# Patient Record
Sex: MALE | Race: NATIVE HAWAIIAN OR OTHER PACIFIC ISLANDER | NOT HISPANIC OR LATINO | Employment: FULL TIME | URBAN - METROPOLITAN AREA
[De-identification: names, ages, dates, MRNs, and addresses within clinical notes are randomized per-mention and may not be internally consistent; named-entity substitution may affect disease eponyms.]

---

## 2019-02-11 ENCOUNTER — HOSPITAL ENCOUNTER (EMERGENCY)
Facility: MEDICAL CENTER | Age: 19
End: 2019-02-11
Attending: EMERGENCY MEDICINE
Payer: COMMERCIAL

## 2019-02-11 ENCOUNTER — APPOINTMENT (OUTPATIENT)
Dept: RADIOLOGY | Facility: MEDICAL CENTER | Age: 19
End: 2019-02-11
Attending: EMERGENCY MEDICINE
Payer: COMMERCIAL

## 2019-02-11 VITALS
SYSTOLIC BLOOD PRESSURE: 107 MMHG | HEART RATE: 78 BPM | DIASTOLIC BLOOD PRESSURE: 60 MMHG | WEIGHT: 145.5 LBS | TEMPERATURE: 97.2 F | OXYGEN SATURATION: 98 % | BODY MASS INDEX: 24.84 KG/M2 | RESPIRATION RATE: 16 BRPM | HEIGHT: 64 IN

## 2019-02-11 DIAGNOSIS — N45.1 EPIDIDYMITIS: ICD-10-CM

## 2019-02-11 LAB
APPEARANCE UR: CLEAR
APPEARANCE UR: CLEAR
BILIRUB UR QL STRIP.AUTO: NEGATIVE
COLOR UR AUTO: ABNORMAL
COLOR UR: YELLOW
GLUCOSE UR QL STRIP.AUTO: NEGATIVE MG/DL
GLUCOSE UR STRIP.AUTO-MCNC: NEGATIVE MG/DL
KETONES UR QL STRIP.AUTO: NEGATIVE MG/DL
KETONES UR STRIP.AUTO-MCNC: ABNORMAL MG/DL
LEUKOCYTE ESTERASE UR QL STRIP.AUTO: NEGATIVE
LEUKOCYTE ESTERASE UR QL STRIP.AUTO: NEGATIVE
MICRO URNS: ABNORMAL
NITRITE UR QL STRIP.AUTO: NEGATIVE
NITRITE UR QL STRIP.AUTO: NEGATIVE
PH UR STRIP.AUTO: 5.5 [PH]
PH UR STRIP.AUTO: 6 [PH]
PROT UR QL STRIP: ABNORMAL MG/DL
PROT UR QL STRIP: NEGATIVE MG/DL
RBC UR QL AUTO: NEGATIVE
RBC UR QL AUTO: NEGATIVE
SP GR UR STRIP.AUTO: 1.03
SP GR UR: 1.02
UROBILINOGEN UR STRIP.AUTO-MCNC: 0.2 MG/DL

## 2019-02-11 PROCEDURE — 76870 US EXAM SCROTUM: CPT

## 2019-02-11 PROCEDURE — 81003 URINALYSIS AUTO W/O SCOPE: CPT

## 2019-02-11 PROCEDURE — 99284 EMERGENCY DEPT VISIT MOD MDM: CPT

## 2019-02-11 PROCEDURE — 87491 CHLMYD TRACH DNA AMP PROBE: CPT

## 2019-02-11 PROCEDURE — 81002 URINALYSIS NONAUTO W/O SCOPE: CPT

## 2019-02-11 PROCEDURE — 87591 N.GONORRHOEAE DNA AMP PROB: CPT

## 2019-02-11 RX ORDER — IBUPROFEN 800 MG/1
800 TABLET ORAL
Qty: 30 TAB | Refills: 0 | Status: SHIPPED | OUTPATIENT
Start: 2019-02-11 | End: 2021-02-26

## 2019-02-11 RX ORDER — DOXYCYCLINE 100 MG/1
100 CAPSULE ORAL 2 TIMES DAILY
Qty: 14 CAP | Refills: 0 | Status: SHIPPED | OUTPATIENT
Start: 2019-02-11

## 2019-02-11 RX ORDER — HYDROCODONE BITARTRATE AND ACETAMINOPHEN 5; 325 MG/1; MG/1
1 TABLET ORAL EVERY 8 HOURS PRN
Qty: 20 TAB | Refills: 0 | Status: SHIPPED | OUTPATIENT
Start: 2019-02-11 | End: 2019-02-17

## 2019-02-11 NOTE — ED NOTES
Pt given discharge instructions/prescriptions given/ narcotic consent signed/ work note given/ home care instructions explained/ pt understands the importance of follow up, pt verbalized understanding of instructions given, pt ambulatory to TRACY wagner.

## 2019-02-12 LAB
C TRACH DNA SPEC QL NAA+PROBE: NEGATIVE
N GONORRHOEA DNA SPEC QL NAA+PROBE: NEGATIVE
SPECIMEN SOURCE: NORMAL

## 2019-02-12 NOTE — ED PROVIDER NOTES
"ED Provider Note    CHIEF COMPLAINT  Chief Complaint   Patient presents with   • Testicle Pain     right side swelling and pain x2 weeks       HPI  Kyle Wheat is a 18 y.o. male who presents to emerge from today with complaints of 2-week history of right testicular pain.  Patient denies any injury to the area he does have some swelling and pain over the right testicle no nausea vomiting no difficulty urinating no burning or frequency urination no sores or discharge.    REVIEW OF SYSTEMS  See HPI for further details. All other systems are negative.      PAST MEDICAL HISTORY  No past medical history on file.    FAMILY HISTORY  History reviewed. No pertinent family history.    SOCIAL HISTORY  Social History     Social History   • Marital status:      Spouse name: N/A   • Number of children: N/A   • Years of education: N/A     Social History Main Topics   • Smoking status: Never Smoker   • Smokeless tobacco: Current User     Types: Chew   • Alcohol use No   • Drug use: No   • Sexual activity: Not on file     Other Topics Concern   • Not on file     Social History Narrative   • No narrative on file       SURGICAL HISTORY  Past Surgical History:   Procedure Laterality Date   • OTHER  7 year old    \"spine surgery\" unsure       CURRENT MEDICATIONS  Home Medications     Reviewed by Prem Gupta R.N. (Registered Nurse) on 02/11/19 at 1105  Med List Status: Complete   Medication Last Dose Status        Patient Osmar Taking any Medications                       ALLERGIES  No Known Allergies    PHYSICAL EXAM  VITAL SIGNS: /60   Pulse 78   Temp 36.2 °C (97.2 °F) (Temporal)   Resp 16   Ht 1.626 m (5' 4\")   Wt 66 kg (145 lb 8.1 oz)   SpO2 98%   BMI 24.98 kg/m²  Room air O2: 99    Constitutional: Well developed, Well nourished, No acute distress, Non-toxic appearance.   HENT: Normocephalic, Atraumatic, Bilateral external ears normal, Oropharynx moist, No oral exudates, Nose normal.   Eyes: PERRLA, EOMI, " Conjunctiva normal, No discharge.   Neck: Normal range of motion, No tenderness, Supple, No stridor.   Lymphatic: No lymphadenopathy noted.   Cardiovascular: Normal heart rate, Normal rhythm, No murmurs, No rubs, No gallops.   Thorax & Lungs: Normal breath sounds, No respiratory distress, No wheezing, No chest tenderness.   Abdomen: Bowel sounds normal, Soft, No tenderness, No masses, No pulsatile masses.   Genitalia: Tenderness and swelling over the epididymis and right testicle area no hernia noted no discharge.  Skin: Warm, Dry, No erythema, No rash.   Back: No tenderness, No CVA tenderness.     RADIOLOGY/PROCEDURES  [id number]   Final Result      Findings moderately suspicious for RIGHT epididymitis            COURSE & MEDICAL DECISION MAKING  Pertinent Labs & Imaging studies reviewed. (See chart for details)  Patient has epididymitis we discussed use of a jock supporter may use ice to the area placed on nonsteroidal anti-inflammatory and doxycycline GC culture pending patient verbalizes understand instructions given referral to urologist Dr. Loco.    FINAL IMPRESSION  1.  Acute epididymitis, right testicle  2.   3.      Electronically signed by: Thai Gay, 2/11/2019 6:01 PM

## 2019-04-30 ENCOUNTER — HOSPITAL ENCOUNTER (OUTPATIENT)
Dept: LAB | Facility: MEDICAL CENTER | Age: 19
End: 2019-04-30
Attending: PHYSICIAN ASSISTANT
Payer: COMMERCIAL

## 2019-04-30 LAB
ALBUMIN SERPL BCP-MCNC: 4.3 G/DL (ref 3.2–4.9)
ALBUMIN/GLOB SERPL: 1.3 G/DL
ALP SERPL-CCNC: 76 U/L (ref 30–99)
ALT SERPL-CCNC: 29 U/L (ref 2–50)
ANION GAP SERPL CALC-SCNC: 7 MMOL/L (ref 0–11.9)
AST SERPL-CCNC: 21 U/L (ref 12–45)
BASOPHILS # BLD AUTO: 0.3 % (ref 0–1.8)
BASOPHILS # BLD: 0.02 K/UL (ref 0–0.12)
BILIRUB SERPL-MCNC: 0.6 MG/DL (ref 0.1–1.5)
BUN SERPL-MCNC: 18 MG/DL (ref 8–22)
CALCIUM SERPL-MCNC: 9.4 MG/DL (ref 8.5–10.5)
CHLORIDE SERPL-SCNC: 101 MMOL/L (ref 96–112)
CHOLEST SERPL-MCNC: 107 MG/DL (ref 100–199)
CO2 SERPL-SCNC: 30 MMOL/L (ref 20–33)
CREAT SERPL-MCNC: 0.81 MG/DL (ref 0.5–1.4)
EOSINOPHIL # BLD AUTO: 0.45 K/UL (ref 0–0.51)
EOSINOPHIL NFR BLD: 6.1 % (ref 0–6.9)
ERYTHROCYTE [DISTWIDTH] IN BLOOD BY AUTOMATED COUNT: 36.1 FL (ref 35.9–50)
FASTING STATUS PATIENT QL REPORTED: NORMAL
GLOBULIN SER CALC-MCNC: 3.2 G/DL (ref 1.9–3.5)
GLUCOSE SERPL-MCNC: 88 MG/DL (ref 65–99)
HCT VFR BLD AUTO: 49.2 % (ref 42–52)
HDLC SERPL-MCNC: 28 MG/DL
HGB BLD-MCNC: 16.5 G/DL (ref 14–18)
IMM GRANULOCYTES # BLD AUTO: 0.01 K/UL (ref 0–0.11)
IMM GRANULOCYTES NFR BLD AUTO: 0.1 % (ref 0–0.9)
LDLC SERPL CALC-MCNC: 67 MG/DL
LYMPHOCYTES # BLD AUTO: 2.13 K/UL (ref 1–4.8)
LYMPHOCYTES NFR BLD: 28.9 % (ref 22–41)
MCH RBC QN AUTO: 28.5 PG (ref 27–33)
MCHC RBC AUTO-ENTMCNC: 33.5 G/DL (ref 33.7–35.3)
MCV RBC AUTO: 85.1 FL (ref 81.4–97.8)
MONOCYTES # BLD AUTO: 0.47 K/UL (ref 0–0.85)
MONOCYTES NFR BLD AUTO: 6.4 % (ref 0–13.4)
NEUTROPHILS # BLD AUTO: 4.29 K/UL (ref 1.82–7.42)
NEUTROPHILS NFR BLD: 58.2 % (ref 44–72)
NRBC # BLD AUTO: 0 K/UL
NRBC BLD-RTO: 0 /100 WBC
PLATELET # BLD AUTO: 212 K/UL (ref 164–446)
PMV BLD AUTO: 10.5 FL (ref 9–12.9)
POTASSIUM SERPL-SCNC: 3.6 MMOL/L (ref 3.6–5.5)
PROT SERPL-MCNC: 7.5 G/DL (ref 6–8.2)
RBC # BLD AUTO: 5.78 M/UL (ref 4.7–6.1)
SODIUM SERPL-SCNC: 138 MMOL/L (ref 135–145)
T3FREE SERPL-MCNC: 4.29 PG/ML (ref 2.4–4.2)
T4 FREE SERPL-MCNC: 0.91 NG/DL (ref 0.53–1.43)
TRIGL SERPL-MCNC: 58 MG/DL (ref 0–149)
TSH SERPL DL<=0.005 MIU/L-ACNC: 0.06 UIU/ML (ref 0.38–5.33)
WBC # BLD AUTO: 7.4 K/UL (ref 4.8–10.8)

## 2019-04-30 PROCEDURE — 84481 FREE ASSAY (FT-3): CPT

## 2019-04-30 PROCEDURE — 80053 COMPREHEN METABOLIC PANEL: CPT

## 2019-04-30 PROCEDURE — 80061 LIPID PANEL: CPT

## 2019-04-30 PROCEDURE — 85025 COMPLETE CBC W/AUTO DIFF WBC: CPT

## 2019-04-30 PROCEDURE — 84439 ASSAY OF FREE THYROXINE: CPT

## 2019-04-30 PROCEDURE — 84443 ASSAY THYROID STIM HORMONE: CPT

## 2019-04-30 PROCEDURE — 36415 COLL VENOUS BLD VENIPUNCTURE: CPT

## 2020-03-19 ENCOUNTER — TELEPHONE (OUTPATIENT)
Dept: SCHEDULING | Facility: IMAGING CENTER | Age: 20
End: 2020-03-19

## 2020-03-19 ENCOUNTER — TELEPHONE (OUTPATIENT)
Dept: HEALTH INFORMATION MANAGEMENT | Facility: OTHER | Age: 20
End: 2020-03-19

## 2020-03-19 ENCOUNTER — APPOINTMENT (OUTPATIENT)
Dept: MEDICAL GROUP | Facility: LAB | Age: 20
End: 2020-03-19
Payer: COMMERCIAL

## 2020-03-19 NOTE — TELEPHONE ENCOUNTER
1. Caller Name: Kyle Wheat           Call Back Number:[phone]    [facility] PCP or Specialty Provider: No            2.  Does patient have any active symptoms of respiratory illness (fever OR cough OR shortness of breath)? No.    3.  Does patient have any comoribidities? None     4.  In the last 30 days, has the patient traveled outside of the country OR in a high risk area within the [place] OR have any known contact with someone who has or is suspected to have COVID-19?  No.    5. Disposition: Cleared by RN Triage; OK to keep/schedule appointment    Note routed to PCP: FYI only.

## 2020-05-31 ENCOUNTER — APPOINTMENT (OUTPATIENT)
Dept: RADIOLOGY | Facility: MEDICAL CENTER | Age: 20
End: 2020-05-31
Attending: EMERGENCY MEDICINE
Payer: COMMERCIAL

## 2020-05-31 ENCOUNTER — HOSPITAL ENCOUNTER (EMERGENCY)
Facility: MEDICAL CENTER | Age: 20
End: 2020-05-31
Attending: EMERGENCY MEDICINE | Admitting: EMERGENCY MEDICINE
Payer: COMMERCIAL

## 2020-05-31 VITALS
TEMPERATURE: 98.6 F | HEART RATE: 81 BPM | RESPIRATION RATE: 18 BRPM | HEIGHT: 64 IN | SYSTOLIC BLOOD PRESSURE: 118 MMHG | OXYGEN SATURATION: 98 % | WEIGHT: 142 LBS | BODY MASS INDEX: 24.24 KG/M2 | DIASTOLIC BLOOD PRESSURE: 82 MMHG

## 2020-05-31 DIAGNOSIS — J06.9 UPPER RESPIRATORY TRACT INFECTION, UNSPECIFIED TYPE: ICD-10-CM

## 2020-05-31 DIAGNOSIS — H66.002 NON-RECURRENT ACUTE SUPPURATIVE OTITIS MEDIA OF LEFT EAR WITHOUT SPONTANEOUS RUPTURE OF TYMPANIC MEMBRANE: ICD-10-CM

## 2020-05-31 DIAGNOSIS — Z20.822 SUSPECTED COVID-19 VIRUS INFECTION: ICD-10-CM

## 2020-05-31 DIAGNOSIS — R51.9 ACUTE NONINTRACTABLE HEADACHE, UNSPECIFIED HEADACHE TYPE: ICD-10-CM

## 2020-05-31 DIAGNOSIS — J02.9 SORE THROAT: ICD-10-CM

## 2020-05-31 LAB
COVID ORDER STATUS COVID19: NORMAL
S PYO DNA SPEC NAA+PROBE: NOT DETECTED

## 2020-05-31 PROCEDURE — A9270 NON-COVERED ITEM OR SERVICE: HCPCS | Performed by: EMERGENCY MEDICINE

## 2020-05-31 PROCEDURE — 87651 STREP A DNA AMP PROBE: CPT

## 2020-05-31 PROCEDURE — 71045 X-RAY EXAM CHEST 1 VIEW: CPT

## 2020-05-31 PROCEDURE — 700102 HCHG RX REV CODE 250 W/ 637 OVERRIDE(OP): Performed by: EMERGENCY MEDICINE

## 2020-05-31 PROCEDURE — 99283 EMERGENCY DEPT VISIT LOW MDM: CPT

## 2020-05-31 PROCEDURE — C9803 HOPD COVID-19 SPEC COLLECT: HCPCS | Performed by: EMERGENCY MEDICINE

## 2020-05-31 RX ORDER — ACETAMINOPHEN 325 MG/1
975 TABLET ORAL ONCE
Status: COMPLETED | OUTPATIENT
Start: 2020-05-31 | End: 2020-05-31

## 2020-05-31 RX ORDER — AMOXICILLIN 500 MG/1
1000 CAPSULE ORAL 2 TIMES DAILY
Qty: 28 CAP | Refills: 0 | Status: SHIPPED | OUTPATIENT
Start: 2020-05-31 | End: 2020-06-07

## 2020-05-31 RX ORDER — ACETAMINOPHEN 500 MG
1000 TABLET ORAL ONCE
Status: DISCONTINUED | OUTPATIENT
Start: 2020-05-31 | End: 2020-05-31

## 2020-05-31 RX ORDER — AMOXICILLIN 500 MG/1
1000 CAPSULE ORAL ONCE
Status: COMPLETED | OUTPATIENT
Start: 2020-05-31 | End: 2020-05-31

## 2020-05-31 RX ORDER — ACETAMINOPHEN 500 MG
1000 TABLET ORAL EVERY 8 HOURS PRN
Qty: 20 TAB | Refills: 0 | Status: SHIPPED | OUTPATIENT
Start: 2020-05-31 | End: 2021-02-26

## 2020-05-31 RX ADMIN — ACETAMINOPHEN 975 MG: 325 TABLET, FILM COATED ORAL at 18:57

## 2020-05-31 RX ADMIN — AMOXICILLIN 1000 MG: 500 CAPSULE ORAL at 18:57

## 2020-05-31 ASSESSMENT — FIBROSIS 4 INDEX: FIB4 SCORE: 0.39

## 2020-05-31 ASSESSMENT — LIFESTYLE VARIABLES: DO YOU DRINK ALCOHOL: NO

## 2020-05-31 NOTE — Clinical Note
Kyle Wheat was seen and treated in our emergency department on 5/31/2020.  He may return to work on 06/02/2020.       If you have any questions or concerns, please don't hesitate to call.      Tc Morgan M.D.

## 2020-06-01 NOTE — ED PROVIDER NOTES
ED Provider Note    CHIEF COMPLAINT  Chief Complaint   Patient presents with   • Headache       Hospitals in [place]    Primary care provider: None  Means of arrival: POV  History obtained from: Patient  History limited by: Nothing    Kyle Wheat is a 19 y.o. male who presents with URI symptoms and headache.  Symptoms began approximately 2 to 3 days ago.  He mostly has a scratchy sore throat, as well as a dull achy right sided nonradiating intermittent headache.  No thunderclap onset.  No falls or injuries or trauma.  No double or blurry vision.  No numbness or weakness or tingling speech changes.  No confusion.  No fevers.  He tried an over-the-counter pain relief medication which gave only minimal relief.  No aggravating factors noted.  No history of headaches or trauma or migraine.  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  No known coronavirus contacts.  He has no dyspnea, but does report an intermittent mild dry cough.  Takes no daily medications otherwise healthy.    REVIEW OF SYSTEMS  Constitutional: Negative for fever or chills.   HENT: Negative for nosebleeds but positive for right-sided headache and sore throat.    Eyes: Negative for vision changes or discharge.   Respiratory: Positive for intermittent cough, negative for dyspnea.  Cardiovascular: Negative for chest pain or palpitations.   Gastrointestinal: Negative for nausea, vomiting, or abdominal pain.   Genitourinary: Negative for dysuria or flank pain.   Musculoskeletal: Negative for back pain or joint pain.   Skin: Negative for itching or rash.   Neurological: Negative for sensory or motor changes.   See HPI for further details. All other systems are negative.     PAST MEDICAL HISTORY  No chronic medical history no daily medications.    PAST FAMILY HISTORY  History reviewed no pertinent past family history.    SOCIAL HISTORY  Social History     Tobacco Use   • Smoking status: Never Smoker   • Smokeless  "tobacco: Current User     Types: Chew   Substance and Sexual Activity   • Alcohol use: No   • Drug use: No   • Sexual activity: Not on file       SURGICAL HISTORY   has a past surgical history that includes other (7 year old).    CURRENT MEDICATIONS  No daily medications as needed over-the-counter spray complaints.    ALLERGIES  No Known Allergies    PHYSICAL EXAM  VITAL SIGNS: /82   Pulse 81   Temp 37 °C (98.6 °F)   Resp 18   Ht 1.626 m (5' 4\")   Wt 64.4 kg (142 lb)   SpO2 98%   BMI 24.37 kg/m²    Pulse ox interpretation: On room air, I interpret this pulse ox as normal.  Constitutional: Well-developed, well-nourished. Sitting up.   HEENT: Normocephalic, atraumatic. Posterior pharynx slightly erythematous uvula midline no exudates, left TM is dull with mild bulge, mucous membranes moist.  Eyes:  EOMI. Normal sclerae.  Neck: Supple, nontender.  Chest/Pulmonary: Clear to ausculation bilaterally, no wheezes or rhonchi.  Cardiovascular: Regular rate and rhythm, no murmur.   Abdomen: Soft, nontender; no rebound, guarding, or masses.  Back: No CVA or midline tenderness.   Musculoskeletal: No deformity or edema.  Neuro: Clear speech, normal coordination, cranial nerves II-XII grossly intact, no focal asymmetry or sensory deficits.   Psych: Normal mood and affect.  Skin: No rashes, warm and dry.      DIAGNOSTIC STUDIES / PROCEDURES    LABS & EKG  Results for orders placed or performed during the hospital encounter of 05/31/20   Group A Strep by PCR    Specimen: Throat   Result Value Ref Range    Group A Strep by PCR Not detected Not Detected   COVID/SARS CoV-2    Specimen: Nasopharyngeal; Respirate   Result Value Ref Range    COVID Order Status Received    2019-nCoV RNA ([facility])   Result Value Ref Range    2019-nCoV Source NP Swab          RADIOLOGY  DX-CHEST-PORTABLE (1 VIEW)   Final Result      No acute cardiopulmonary abnormality identified.          COURSE & MEDICAL DECISION MAKING    This is a 19 y.o. male " who presents with URI symptoms and headache.  Normal neurologic examination no trauma.    Differential Diagnosis includes but is not limited to:  Tension, URI, migraine, intracranial hemorrhage, mass, coronavirus, strep throat    ED Course:  This is a pleasant healthy 19-year-old male coming in with URI symptoms and headache, given current diabetic medicine treatment for coronavirus testing.  He has a sore throat as well we will assess with strep testing, screening chest x-ray, he has an obvious left-sided otitis media with a plan to treat with amoxicillin.  Acetaminophen for pain relief.    Work-up today is reassuring, strep test negative chest x-ray clear.  Normal vital signs.  No trauma or thunderclap onset doubt intracranial hemorrhage, no neurologic deficits or vision changes doubt intracranial mass.  Patient is well-appearing on recheck he is feeling better after acetaminophen, plan amoxicillin for his otitis media, rotavirus testing pending patient instructed to quarantine and rested upon entering plenty of fluids until test results work note provided so he may do so.  He will return immediately if he has any new or worsening symptoms I informed the patient that his records that he does have a coronavirus, he arrives wearing a mask from home and asked that he continue to use that and practice self isolation until tests results and he is feeling better.    Medications   amoxicillin (AMOXIL) capsule 1,000 mg (1,000 mg Oral Given 5/31/20 1857)   acetaminophen (TYLENOL) tablet 975 mg (975 mg Oral Given 5/31/20 1857)       FINAL IMPRESSION  1. Upper respiratory tract infection, unspecified type    2. Acute nonintractable headache, unspecified headache type    3. Non-recurrent acute suppurative otitis media of left ear without spontaneous rupture of tympanic membrane    4. Sore throat    5. Suspected COVID-19 virus infection        PRESCRIPTIONS  Discharge Medication List as of 5/31/2020  8:15 PM      START taking  these medications    Details   acetaminophen (TYLENOL) 500 MG Tab Take 2 Tabs by mouth every 8 hours as needed for Moderate Pain., Disp-20 Tab,R-0, Print Rx Paper      amoxicillin (AMOXIL) 500 MG Cap Take 2 Caps by mouth 2 times a day for 7 days., Disp-28 Cap,R-0, Print Rx Paper             FOLLOW UP  [facility], Emergency Dept  [address] [facility] [address] [phone]  Today  If you have ANY new or worse symptoms!    [address] [address] [phone]  Schedule an appointment as soon as possible for a visit in 2 days  for recheck and routine health care        -DISCHARGE-       Results, exam findings, clinical impression, presumed diagnosis, treatment options, and strict return precautions were discussed with the patient, and they verbalized understanding, agreed with, and appreciated the plan of care.    Pertinent Labs & Imaging studies reviewed and verified by myself, as well as nursing notes and the patient's past medical, family, and social histories (See chart for details).    The patient is referred to a primary care clinic for blood pressure management, diabetic screening, and for all other preventative health concerns.     Portions of this record were made with voice recognition software.  Despite my review, spelling/grammar/context errors may still remain.  Interpretation of this chart should be taken in this context.    Electronically signed by Tc Morgan M.D. on 6/1/2020 at 9:23 AM.

## 2020-06-01 NOTE — DISCHARGE INSTRUCTIONS
You were seen and evaluated in the Emergency Department at [facility] for:     Sore throat, headache    You had the following tests and studies:    Thankfully your strep test is negative and your chest xray is normal with no pneumonia. Your coronavirus/COVID-19 test is pending.    You received the following medications:    Amoxicillin for ear infection, acetaminophen for pain.     You received the following prescriptions:    Amoxicillin and acetaminophen, take only these medicines this week as prescribed.  ----------------------------    Please make sure to follow up with:    [facility] for recheck and routine health care.    You may have the coronavirus, so please act as if you do until tests result. Wear a mask, wash your hands, and return if you have any new or worse symptoms!    Good luck, we hope you get better soon!  ----------------------------    We always encourage patients to return IMMEDIATELY if they have:  Increased or changing pain, passing out, fevers over 100.4 (taken in your mouth or rectally) for more than 2 days, redness or swelling of skin or tissues, feeling like your heart is beating fast, chest pain that is new or worsening, trouble breathing, feeling like your throat is closing up and can not breath, inability to walk, weakness of any part of your body, new dizziness, severe bleeding that won't stop from any part of your body, if you can't eat or drink, or if you have any other concerns.   If you feel worse, please know that you can always return with any questions, concerns, worse symptoms, or you are feeling unsafe. We certainly cannot say for sure that we have ruled out every illness or dangerous disease, but we feel that at this specific time, your exam, tests, and vital signs like heart rate and blood pressure are safe for discharge.

## 2020-06-04 LAB
SARS-COV-2 RNA RESP QL NAA+PROBE: DETECTED
SPECIMEN SOURCE: ABNORMAL

## 2020-06-04 NOTE — ED NOTES
COVID-19 Test Follow-Up  06/04/20    Patient is positive for COVID-19.    I have informed the patient of the positive result by phone and that the Health Dept would be in contact soon. Instructed them to continue to quarantine and self-isolate according with the CDC guidance or as otherwise directed by the Health Dept.    Per the CDC, he should continue to quarantine until: At least 3 days (72 hours) have passed since recovery defined as resolution of fever without the use of fever-reducing medications and improvement in respiratory symptoms (e.g., cough, shortness of breath); and, At least 10 days have passed since symptoms first appeared.    He states he is feeling better. No longer has a headache and has been taking the amoxicillin. I encouraged him to continue the antibiotic until its completion.  He is advised to return to the ER for worsening symptoms including difficulty breathing, ongoing fever, weakness or chest pain.  He is also advised to follow up with the Health Department for additional testing needs and questions.      Christal Masters, PharmD

## 2020-06-08 ENCOUNTER — TELEPHONE (OUTPATIENT)
Dept: HEALTH INFORMATION MANAGEMENT | Facility: OTHER | Age: 20
End: 2020-06-08

## 2020-06-08 NOTE — TELEPHONE ENCOUNTER
Reached pt on first attempt at number listed.    Pts symptoms are improving    Pt does have questions w/r/t how long after resolution of symptoms that he can return to work. Pt given phone numbers for [facility] department and [facility] nurse triage line.    Pt did not have any questions about the CDC self-isolation guidelines. Reviewed the below with the patient and they stated they were following these guidelines  a. Staying home and frequently washing your hands, and disinfecting commonly used household items (such as cellphone, remote, door handles, tabletops, faucets, etc.)  b.  You are wearing a mask or some sort of effective personal protection equipment (I can provide resources for them if needed), as well as covering your cough and avoiding sharing household items.   c. You are staying in your own room; besides caretaker coming and going to help-out, you are sleeping in your own space away from others.    Pt was able to schedule and complete testing at [facility] for COVID-19.    Reminded pt that we are available if any questions arise; they should return to [facility] Ed /urgent care if their sx worsen; and call 911 if they have a medical emergency.

## 2021-02-26 ENCOUNTER — HOSPITAL ENCOUNTER (EMERGENCY)
Facility: MEDICAL CENTER | Age: 21
End: 2021-02-27
Attending: EMERGENCY MEDICINE

## 2021-02-26 DIAGNOSIS — M54.50 ACUTE LEFT-SIDED LOW BACK PAIN WITHOUT SCIATICA: ICD-10-CM

## 2021-02-26 PROCEDURE — 96372 THER/PROPH/DIAG INJ SC/IM: CPT

## 2021-02-26 PROCEDURE — 99283 EMERGENCY DEPT VISIT LOW MDM: CPT

## 2021-02-26 RX ORDER — ACETAMINOPHEN 500 MG
1000 TABLET ORAL ONCE
Status: COMPLETED | OUTPATIENT
Start: 2021-02-27 | End: 2021-02-27

## 2021-02-26 RX ORDER — KETOROLAC TROMETHAMINE 30 MG/ML
15 INJECTION, SOLUTION INTRAMUSCULAR; INTRAVENOUS ONCE
Status: COMPLETED | OUTPATIENT
Start: 2021-02-27 | End: 2021-02-27

## 2021-02-26 RX ORDER — IBUPROFEN 600 MG/1
600 TABLET ORAL EVERY 8 HOURS PRN
Qty: 30 TABLET | Refills: 0 | Status: SHIPPED | OUTPATIENT
Start: 2021-02-26 | End: 2021-03-08

## 2021-02-26 RX ORDER — ACETAMINOPHEN 500 MG
500-1000 TABLET ORAL EVERY 6 HOURS PRN
Qty: 30 TABLET | Refills: 0 | Status: SHIPPED | OUTPATIENT
Start: 2021-02-26

## 2021-02-26 RX ORDER — CYCLOBENZAPRINE HCL 5 MG
5-10 TABLET ORAL 3 TIMES DAILY PRN
Qty: 30 TABLET | Refills: 0 | Status: SHIPPED | OUTPATIENT
Start: 2021-02-26

## 2021-02-26 ASSESSMENT — FIBROSIS 4 INDEX: FIB4 SCORE: 0.4

## 2021-02-26 ASSESSMENT — PAIN DESCRIPTION - DESCRIPTORS: DESCRIPTORS: STABBING

## 2021-02-27 ENCOUNTER — PATIENT OUTREACH (OUTPATIENT)
Dept: HEALTH INFORMATION MANAGEMENT | Facility: OTHER | Age: 21
End: 2021-02-27

## 2021-02-27 VITALS
HEIGHT: 66 IN | TEMPERATURE: 97.4 F | HEART RATE: 87 BPM | SYSTOLIC BLOOD PRESSURE: 111 MMHG | DIASTOLIC BLOOD PRESSURE: 59 MMHG | RESPIRATION RATE: 16 BRPM | WEIGHT: 168.21 LBS | OXYGEN SATURATION: 97 % | BODY MASS INDEX: 27.03 KG/M2

## 2021-02-27 PROCEDURE — 700111 HCHG RX REV CODE 636 W/ 250 OVERRIDE (IP): Performed by: EMERGENCY MEDICINE

## 2021-02-27 PROCEDURE — A9270 NON-COVERED ITEM OR SERVICE: HCPCS | Performed by: EMERGENCY MEDICINE

## 2021-02-27 PROCEDURE — 700102 HCHG RX REV CODE 250 W/ 637 OVERRIDE(OP): Performed by: EMERGENCY MEDICINE

## 2021-02-27 RX ADMIN — ACETAMINOPHEN 1000 MG: 500 TABLET ORAL at 00:06

## 2021-02-27 RX ADMIN — KETOROLAC TROMETHAMINE 15 MG: 30 INJECTION, SOLUTION INTRAMUSCULAR; INTRAVENOUS at 00:06

## 2021-02-27 NOTE — PROGRESS NOTES
Received incoming order from [facility] to schedule patient with PCP. Patient does not have insurance but can be seen at [facility] or [facility]. CHW called ptient and offered to reach out to representatives from clinics to reach out to patient. Patient declined this service and [facility] appointment. Patient said he will take care of this himself and greatly appreciated the follow up call. CHW let patient know if he would like assistance in the future to please give CHW a call.

## 2021-02-27 NOTE — ED TRIAGE NOTES
"Chief Complaint   Patient presents with   • Flank Pain     Patient ambulatory to triage. Patient states that he has been having Left sided flank pain x 3 days. Pain is described as \"stabbing\" and has been continuously increasing. Patient denies any other s/s. /88   Pulse 88   Temp 36.3 °C (97.4 °F) (Temporal)   Resp 16   Ht 1.676 m (5' 6\")   Wt 76.3 kg (168 lb 3.4 oz)   SpO2 98%   BMI 27.15 kg/m²     "

## 2021-02-27 NOTE — ED PROVIDER NOTES
ED Provider Note    Scribed for Kenneth Siddiqi M.D. by Marilu Kwok. 2/26/2021  11:30 PM    Primary care provider: Pcp Pt States None  Means of arrival: Walk in  History obtained from: Patient  History limited by: None    CHIEF COMPLAINT  Chief Complaint   Patient presents with   • Flank Pain       HPI  Kyle Wheat is a 20 y.o. male who presents to the Emergency Department with waxing and waning left sided lower back pain onset 3 days ago. The pain does not move from the left side of his back. Per the patient, he noticed the pain when he woke up and initially believed that he slept in a weird position. He states the pain is exacerbated by movement, but when he is sitting he can't feel it. The patient denies hematuria, urinary retention, cough, fever, shortness of breath, weakness or numbness in legs, changes in bowel movements, or groin pain. No other exacerbating or alleviating factors were noted. 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     REVIEW OF SYSTEMS  Pertinent positives include: Left sided lower back pain. Pertinent negatives include:  hematuria, urinary retention, cough, fever, shortness of breath, weakness or numbness in legs, changes in bowel movements, or groin pain. See history of present illness.     PAST MEDICAL HISTORY   None pertinent    SURGICAL HISTORY   has a past surgical history that includes other (7 year old).    SOCIAL HISTORY  Social History     Tobacco Use   • Smoking status: Never Smoker   • Smokeless tobacco: Current User     Types: Chew   Substance Use Topics   • Alcohol use: No   • Drug use: No      Social History     Substance and Sexual Activity   Drug Use No       FAMILY HISTORY  None pertinent    CURRENT MEDICATIONS  Home Medications     Reviewed by Anjelica Joy R.N. (Registered Nurse) on 02/26/21 at 2906  Med List Status: Partial   Medication Last Dose Status  "  acetaminophen (TYLENOL) 500 MG Tab  Active   doxycycline (MONODOX) 100 MG capsule  Active   ibuprofen (MOTRIN) 800 MG Tab  Active                ALLERGIES  No Known Allergies    PHYSICAL EXAM  VITAL SIGNS: /88   Pulse 88   Temp 36.3 °C (97.4 °F) (Temporal)   Resp 16   Ht 1.676 m (5' 6\")   Wt 76.3 kg (168 lb 3.4 oz)   SpO2 98%   BMI 27.15 kg/m²     Constitutional: Alert in no apparent distress.  HENT: No signs of trauma, Bilateral external ears normal, Nose normal. Uvula midline.   Eyes: Pupils are equal and reactive, Conjunctiva normal, Non-icteric.   Neck: Normal range of motion, No tenderness, Supple, No stridor.   Lymphatic: No lymphadenopathy noted.   Cardiovascular: Regular rate and rhythm, no murmurs.   Thorax & Lungs: Normal breath sounds, No respiratory distress, No wheezing, No chest tenderness.   Abdomen:  Soft, No tenderness, No peritoneal signs, No masses, No pulsatile masses.   Skin: Warm, Dry, No erythema, No rash.   Back: Left sided lumbar paraspinal tenderness to palpation, No CVA tenderness. Negative SERGEY test bilaterally. No C/T/L spine step-offs or deformities, No C/T/L spine tenderness to palpation.   Extremities: Intact distal pulses, No edema, No tenderness, No cyanosis.2+ peripheral pulses distally   Musculoskeletal: Good range of motion in all major joints. No major deformities noted.   Neurologic: Alert , Normal motor function, Normal sensory function, No focal deficits noted. Negative straight leg raise bilaterally, 5/5 strength with intact dorsiflexion and plantarflexion.  Psychiatric: Affect normal, Judgment normal, Mood normal.       COURSE & MEDICAL DECISION MAKING  Nursing notes, Chacha ROBERTS reviewed in chart.    20 y.o. male p/w chief complaint of lower back pain.    11:30 PM Patient seen and examined at bedside. Patient treated with Toradol 15 mg and Tylenol 1000 mg    I verified that the patient was wearing a mask and I was wearing appropriate PPE every time I entered " the room. The patient's mask was on the patient at all times during my encounter except for a brief view of the oropharynx.     The differential diagnoses include but are not limited to:     No weakness or numbness to suggest cauda equina syndrome.  Pt w/ No trauma. No IVDU/fever. No history of cancer/unintentional weight loss. No bowel/bladder dysfunction. No numbness/weakness/saddle anesthesia.  Doubt infectious etiology given no fever  No imaging ordered at this time given no trauma to suggest fx and pt under age 65  No e/o rash to suggest zoster  Pain likely represents MSK pain. Pt given below meds for sx relief in ED  Toradol 15 mg and Tylenol 1000 mg  Pt agrees to f/u w/ PCP or physical therapist if pain persists for greater than 1 wk.  Pt instructed to return to ED if pain continues to persist or worsens.     The patient will return for new or worsening symptoms and is stable at the time of discharge.    The patient is referred to a primary physician for blood pressure management, diabetic screening, and for all other preventative health concerns.    DISPOSITION:  Patient will be discharged home in stable condition.    FOLLOW UP:  [facility], Emergency Dept  [address] [facility] [address] [phone]    If symptoms worsen    [address] [address] [phone]  In 3 days  call to schedule follow up appointment as they may consider physical therapy or MRI if symptoms persist      OUTPATIENT MEDICATIONS:  Discharge Medication List as of 2/27/2021 12:11 AM      START taking these medications    Details   cyclobenzaprine (FLEXERIL) 5 mg tablet Take 1-2 Tablets by mouth 3 times a day as needed., Disp-30 tablet, R-0, Normal             FINAL IMPRESSION  1. Acute left-sided low back pain without sciatica          I, Marilu Kwok (Scribe), am scribing for, and in the presence of, Kenneth Siddiqi M.D..    Electronically signed by: Marilu  Rissa (Scribe), 2/26/2021    IKenneth M.D. personally performed the services described in this documentation, as scribed by Marilu Kwok in my presence, and it is both accurate and complete.    E    The note accurately reflects work and decisions made by me.  Kenneth Siddiqi M.D.  2/27/2021  6:58 AM

## 2021-03-28 ENCOUNTER — NON-PROVIDER VISIT (OUTPATIENT)
Dept: URGENT CARE | Facility: CLINIC | Age: 21
End: 2021-03-28

## 2021-03-28 DIAGNOSIS — Z02.1 PRE-EMPLOYMENT DRUG SCREENING: ICD-10-CM

## 2021-03-28 LAB
AMP AMPHETAMINE: NORMAL
COC COCAINE: NORMAL
INT CON NEG: NORMAL
INT CON POS: NORMAL
MET METHAMPHETAMINES: NORMAL
OPI OPIATES: NORMAL
PCP PHENCYCLIDINE: NORMAL
POC DRUG COMMENT 753798-OCCUPATIONAL HEALTH: NEGATIVE
THC: NORMAL

## 2021-03-28 PROCEDURE — 80305 DRUG TEST PRSMV DIR OPT OBS: CPT | Performed by: NURSE PRACTITIONER

## 2021-05-28 NOTE — ED TRIAGE NOTES
Chief Complaint   Patient presents with   • Testicle Pain     right side swelling and pain x2 weeks     Ambulatory to triage for above. Denies injury. Denies dysuria or discharge. Given urine sample supplies and instructions. Explained triage process, to waiting room. Asked to inform RN if questions or concerns arise.    Statement Selected